# Patient Record
Sex: FEMALE | Race: BLACK OR AFRICAN AMERICAN | NOT HISPANIC OR LATINO | URBAN - METROPOLITAN AREA
[De-identification: names, ages, dates, MRNs, and addresses within clinical notes are randomized per-mention and may not be internally consistent; named-entity substitution may affect disease eponyms.]

---

## 2024-04-22 ENCOUNTER — EMERGENCY (EMERGENCY)
Facility: HOSPITAL | Age: 34
LOS: 1 days | Discharge: HOME | End: 2024-04-22
Admitting: STUDENT IN AN ORGANIZED HEALTH CARE EDUCATION/TRAINING PROGRAM
Payer: MEDICAID

## 2024-04-22 VITALS
SYSTOLIC BLOOD PRESSURE: 123 MMHG | RESPIRATION RATE: 16 BRPM | TEMPERATURE: 98 F | HEART RATE: 78 BPM | OXYGEN SATURATION: 100 % | DIASTOLIC BLOOD PRESSURE: 77 MMHG

## 2024-04-22 VITALS
OXYGEN SATURATION: 100 % | TEMPERATURE: 98 F | RESPIRATION RATE: 16 BRPM | HEART RATE: 73 BPM | SYSTOLIC BLOOD PRESSURE: 114 MMHG | DIASTOLIC BLOOD PRESSURE: 77 MMHG

## 2024-04-22 PROCEDURE — 73564 X-RAY EXAM KNEE 4 OR MORE: CPT | Mod: 26,LT,RT

## 2024-04-22 PROCEDURE — 99284 EMERGENCY DEPT VISIT MOD MDM: CPT

## 2024-04-22 RX ORDER — IBUPROFEN 200 MG
600 TABLET ORAL ONCE
Refills: 0 | Status: COMPLETED | OUTPATIENT
Start: 2024-04-22 | End: 2024-04-22

## 2024-04-22 RX ORDER — ACETAMINOPHEN 500 MG
975 TABLET ORAL ONCE
Refills: 0 | Status: COMPLETED | OUTPATIENT
Start: 2024-04-22 | End: 2024-04-22

## 2024-04-22 RX ADMIN — Medication 600 MILLIGRAM(S): at 13:13

## 2024-04-22 RX ADMIN — Medication 975 MILLIGRAM(S): at 13:13

## 2024-04-22 NOTE — ED PROVIDER NOTE - PATIENT PORTAL LINK FT
You can access the FollowMyHealth Patient Portal offered by [facility] by registering at the following website: [email or web address]. By joining [employer]’s FollowMyHealth portal, you will also be able to view your health information using other applications (apps) compatible with our system.

## 2024-04-22 NOTE — ED PROVIDER NOTE - OBJECTIVE STATEMENT
33-year-old female no significant PMH presenting to ED with bilateral knee pain, right worse than left over the past few weeks.  Patient has been taking ibuprofen with minimal relief, last dose last night.  Pain is worse with bending as well as using stairs.  Denies any fevers, recent illness, trauma.  No prior knee surgeries in the past.  Patient otherwise denies any chest pain, shortness of breath, cough, sore throat, abdominal pain, N/V/D/C.

## 2024-04-22 NOTE — ED PROVIDER NOTE - CLINICAL SUMMARY MEDICAL DECISION MAKING FREE TEXT BOX
33-year-old female no significant PMH presenting to ED with bilateral knee pain, right worse than left over the past few weeks.  Patient has been taking ibuprofen with minimal relief, last dose last night.  Pain is worse with bending as well as using stairs.  Denies any fevers, recent illness, trauma.  No prior knee surgeries in the past.      no c/f ligamentous injury  will xray to r/o bony pathology  ace wrap, NSAIDs, and outpatient ortho f/u

## 2024-04-22 NOTE — ED ADULT NURSE NOTE - OBJECTIVE STATEMENT
Pt. A&OX4, c/o bilateral knee pain worse on the right side with swelling x 3wks. Denies any injury or trauma. Ambulatory in ED. Vitals stable, breathing well on RA. Respirations even and unlabored. Pain meds given as ordered. Will continue to monitor.

## 2024-04-22 NOTE — ED PROVIDER NOTE - NSFOLLOWUPINSTRUCTIONS_ED_ALL_ED_FT
Knee Pain  WHAT YOU NEED TO KNOW:  Knee pain may start suddenly, or it may be a long-term problem. You may have pain on the side, front, or back of your knee. You may have knee stiffness and swelling. You may hear popping sounds or feel like your knee is giving way or locking up as you walk. You may feel pain when you sit, stand, walk, or climb up and down stairs. Knee pain can be caused by conditions such as obesity, inflammation, or strains or tears in ligaments or tendons.   DISCHARGE INSTRUCTIONS:  Return to the emergency department if:   •Your pain is worse, even after treatment.   •You cannot bend or straighten your leg completely.   •The swelling around your knee does not go down even with treatment.  •Your knee is painful and hot to the touch.   Contact your healthcare provider if:   •You have questions or concerns about your condition or care.   Medicines: You may need any of the following:   •NSAIDs help decrease swelling and pain or fever. This medicine is available with or without a doctor's order. NSAIDs can cause stomach bleeding or kidney problems in certain people. If you take blood thinner medicine, always ask your healthcare provider if NSAIDs are safe for you. Always read the medicine label and follow directions.  •Acetaminophen decreases pain and fever. It is available without a doctor's order. Ask how much to take and how often to take it. Follow directions. Read the labels of all other medicines you are using to see if they also contain acetaminophen, or ask your doctor or pharmacist. Acetaminophen can cause liver damage if not taken correctly. Do not use more than 4 grams (4,000 milligrams) total of acetaminophen in one day.   •Prescription pain medicine may be given. Ask your healthcare provider how to take this medicine safely. Some prescription pain medicines contain acetaminophen. Do not take other medicines that contain acetaminophen without talking to your healthcare provider. Too much acetaminophen may cause liver damage. Prescription pain medicine may cause constipation. Ask your healthcare provider how to prevent or treat constipation.   •Take your medicine as directed. Contact your healthcare provider if you think your medicine is not helping or if you have side effects. Tell him or her if you are allergic to any medicine. Keep a list of the medicines, vitamins, and herbs you take. Include the amounts, and when and why you take them. Bring the list or the pill bottles to follow-up visits. Carry your medicine list with you in case of an emergency.  What you can do to manage your symptoms:   •Rest your knee so it can heal. Limit activities that increase your pain. Do low-impact exercises, such as walking or swimming.   •Apply ice to help reduce swelling and pain. Use an ice pack, or put crushed ice in a plastic bag. Cover it with a towel before you apply it to your knee. Apply ice for 15 to 20 minutes every hour, or as directed.  •Apply compression to help reduce swelling. Use a brace or bandage only as directed.  •Elevate your knee to help decrease pain and swelling. Elevate your knee while you are sitting or lying down. Prop your leg on pillows to keep your knee above the level of your heart.  •Prevent your knee from moving as directed. Your healthcare provider may put on a cast or splint. You may need to wear a leg brace to stabilize your knee. A leg brace can be adjusted to increase your range of motion as your knee heals.  Hinged Knee Braces    What you can do to prevent knee pain:   •Maintain a healthy weight. Extra weight increases your risk for knee pain. Ask your healthcare provider how much you should weigh. He or she can help you create a safe weight loss plan if you need to lose weight.  •Exercise or train properly. Use the correct equipment for sports. Wear shoes that provide good support. Check your posture often as you exercise, play sports, or train for an event. This can help prevent stress and strain on your knees. Rest between sessions so you do not overwork your knees.  Follow up with your healthcare provider within 24 hours or as directed: You may need follow-up treatments, such as steroid injections to decrease pain. Write down your questions so you remember to ask them during your visits.   Dolor de rodilla  LO QUE NECESITA SABER:  El dolor de rodilla puede empezar de forma repentina, o ser un problema a peyton plazo. Puede sentir dolor en la parte lateral, delantera o trasera de la rodilla. Puede tener la rodilla rígida e hinchada. Puede oír sonidos de chasquido o sentir que la rodilla cede o se le bloquea al andar. Puede sentir dolor cuando se sienta, se pone de pie, camina o sube y baja escaleras. El dolor de rodilla puede estar provocado por condiciones hilda obesidad, inflamación, esguinces o desgarros de los ligamentos o los tendones.  INSTRUCCIONES SOBRE EL [facility]ITAL[facility]:  Regrese a la sona de emergencias si:  •El dolor empeora, incluso después del tratamiento.  •No puede flexionar o enderezar la pierna completamente.  •La hinchazón alrededor de la rodilla no disminuye a pesar del tratamiento.  •La rodilla le duele y se nota caliente al tacto.  Comuníquese con brady médico si:  •Usted tiene preguntas o inquietudes acerca de brady condición o cuidado.  Medicamentos:Es posible que usted necesite alguno de los siguientes:   •Los EARLENE,pueden disminuir la inflamación y el dolor o la fiebre. Zenia medicamento está disponible con o sin nancy receta médica. Los EARLENE pueden causar sangrado estomacal o problemas renales en ciertas personas. Si usted eva un medicamento anticoagulante, siempre pregúntele a brady médico si los EARLENE son seguros para usted. Siempre stas la etiqueta de zenia medicamento y siga las instrucciones.  •Acetaminofénalivia el dolor y baja la fiebre. Está disponible sin receta médica. Pregunte la cantidad y la frecuencia con que debe tomarlos. Siga las indicaciones. Stas las etiquetas de todos los demás medicamentos que esté usando para saber si también contienen acetaminofén, o pregunte a brady médico o farmacéutico. El acetaminofén puede causar daño en el hígado cuando no se eva de forma correcta. No use más de 4 gramos (4000 miligramos) en total de acetaminofeno en un día.  •Puede administrarsepodrían administrarse. Pregunte al médico cómo debe hal zenia medicamento de forma kirk. Algunos medicamentos recetados para el dolor contienen acetaminofén. No tome otros medicamentos que contengan acetaminofén sin consultarlo con brady médico. Demasiado acetaminofeno puede causar daño al hígado. Los medicamentos recetados para el dolor podrían causar estreñimiento. Pregunte a brady médico hilda prevenir o tratar estreñimiento.  •[place] jaki medicamentos hilda se le haya indicado.Consulte con brady médico si usted gabriele que brady medicamento no le está ayudando o si presenta efectos secundarios. Infórmele si es alérgico a cualquier medicamento. Mantenga nancy lista actualizada de los medicamentos, las vitaminas y los productos herbales que eva. Incluya los siguientes datos de los medicamentos: cantidad, frecuencia y motivo de administración. Traiga con usted la lista o los envases de las píldoras a jaki citas de seguimiento. Lleve la lista de los medicamentos con usted en mihai de nancy emergencia.  Lo que puede hacer para manejar los síntomas:  •Repose la rodilla para que se pueda curar.Limite las actividades que aumenten el dolor. Erasmo ejercicios de bajo impacto, hilda caminar o nadar.  •Aplique hielo para ayudar a disminuir la inflamación y el dolor.Use nancy compresa de hielo o ponga hielo triturado en nancy bolsa de plástico. Cúbrala con nancy toalla antes de aplicarla sobre la herida. Aplique hielo luz elena 15 a 20 minutos por hora o según indicaciones.  •Aplique compresión para ayudar a reducir la inflamación.Use nancy férula o venda solamente si sigue las instrucciones del mihai.  •Eleve la rodilla para ayudar a disminuir el dolor y la inflamación.Eleve la rodilla mientras esté sentado o acostado. Apoye la pierna sobre almohadones para mantener la rodilla por encima del corazón.  •Evite que la rodilla se mueva, hilda se le haya indicado.Brady médico podría ponerle un yeso o férula. Usted podría necesitar de un yeso en brady pierna para estabilizar brady rodilla. El yeso en la pierna puede ajustarse para aumentar brady rango de movimiento a medida que brady rodilla erickson.  Rodillera articulada   Lo que puede hacer para prevenir el dolor de rodilla:  •Mantenga un peso saludable.El exceso de peso aumenta brady riesgo de sufrir dolor de rodilla. Consulte con brady médico cuánto debería pesar. Él puede ayudarlo a crear un plan de pérdida de peso seguro si usted tiene sobrepeso.  •Erasmo ejercicio o entrene correctamente.Utilice los aparatos adecuados para los deportes. Use zapatos que brinden un buen soporte. Verifique brady postura a menudo mientras hace ejercicio, juega deportes o entrena para un evento. Croom puede ayudar a prevenir el estrés y la tensión en las rodillas. Descanse entre sesiones para no esforzar excesivamente las rodillas.  Acuda en 24 horas a nancy leti de seguimiento con brady médico o hilda se le indique:Quizá necesite tratamientos de seguimiento, hilda inyecciones de esteroides para reducir el dolor. [place] jaki preguntas para que se acuerde de hacerlas luz elena jaki visitas.

## 2024-04-22 NOTE — ED ADULT NURSE NOTE - NSFALLUNIVINTERV_ED_ALL_ED
Bed/Stretcher in lowest position, wheels locked, appropriate side rails in place/Call bell, personal items and telephone in reach/Instruct patient to call for assistance before getting out of bed/chair/stretcher/Non-slip footwear applied when patient is off stretcher/[place] to call system/Physically safe environment - no spills, clutter or unnecessary equipment/Purposeful proactive rounding/Room/bathroom lighting operational, light cord in reach

## 2024-04-22 NOTE — ED PROVIDER NOTE - PHYSICAL EXAMINATION
CONSTITUTIONAL: Well-appearing; well-nourished; in no apparent distress;  NECK/LYMPH: Supple; non-tender;  CARD: Normal S1, S2; no murmurs, rubs, or gallops noted  RESP: Normal chest excursion with respiration; breath sounds clear and equal bilaterally; no wheezes, rhonchi, or rales noted  EXT/MS: no visible deformity or erythema or edema noted to b/l knees. + patellar ttp and medial joint line ttp of R Knee. no laxity. negative anterior drawer sign. moves all extremities; distal pulses are normal, no pedal edema  SKIN: Normal for age and race  NEURO: Awake, alert, oriented x 3, no gross deficits  PSYCH: Normal mood; appropriate affect

## 2024-04-22 NOTE — ED ADULT TRIAGE NOTE - CHIEF COMPLAINT QUOTE
ambulatory c/o b/l knee pain X 3 weeks. denies any known injuries. denies any pertinent past medical history.